# Patient Record
Sex: FEMALE | Race: WHITE | NOT HISPANIC OR LATINO | Employment: PART TIME | URBAN - METROPOLITAN AREA
[De-identification: names, ages, dates, MRNs, and addresses within clinical notes are randomized per-mention and may not be internally consistent; named-entity substitution may affect disease eponyms.]

---

## 2017-02-06 ENCOUNTER — RESULTS ENCOUNTER (OUTPATIENT)
Dept: INTERNAL MEDICINE | Facility: CLINIC | Age: 42
End: 2017-02-06

## 2017-02-06 DIAGNOSIS — Z00.00 HEALTHCARE MAINTENANCE: ICD-10-CM

## 2017-02-06 DIAGNOSIS — Z00.00 HEALTHCARE MAINTENANCE: Primary | ICD-10-CM

## 2017-02-11 ENCOUNTER — RESULTS ENCOUNTER (OUTPATIENT)
Dept: INTERNAL MEDICINE | Facility: CLINIC | Age: 42
End: 2017-02-11

## 2017-02-11 DIAGNOSIS — Z00.00 HEALTHCARE MAINTENANCE: ICD-10-CM

## 2017-04-17 ENCOUNTER — APPOINTMENT (OUTPATIENT)
Dept: WOMENS IMAGING | Facility: HOSPITAL | Age: 42
End: 2017-04-17

## 2017-04-17 PROCEDURE — 77067 SCR MAMMO BI INCL CAD: CPT | Performed by: RADIOLOGY

## 2017-07-24 ENCOUNTER — TELEPHONE (OUTPATIENT)
Dept: INTERNAL MEDICINE | Facility: CLINIC | Age: 42
End: 2017-07-24

## 2017-07-24 DIAGNOSIS — Z00.00 ROUTINE ADULT HEALTH MAINTENANCE: Primary | ICD-10-CM

## 2017-07-24 NOTE — TELEPHONE ENCOUNTER
Order put in.  Patient advised.    ----- Message from Naima Montiel MA sent at 2017 10:31 AM EDT -----  Regarding: FW: LAB ORDERS  Contact: [phone]      ----- Message -----     From: Kym Gomez     Sent: 2017  10:24 AM       To: Sukhdev Martini [facility]  Subject: LAB ORDERS                                       :  79  FRANCISCO DAVIS.    PATIENT WANTS TO HAVE HER LABS DONE AT [facility]. SHE CALLED AND THEY DO NOT HAVE HER ORDER. PATIENT WANTS TO GET THEM TODAY/TOMORROW. SHE WILL NEED TO HAVE URINE, THYROID PANEL, AND LABS CHECKED.    PLEASE CALL HER WHEN THE ORDER HAS BEEN PUT IN SYSTEM.

## 2017-07-26 ENCOUNTER — LAB (OUTPATIENT)
Dept: LAB | Facility: HOSPITAL | Age: 42
End: 2017-07-26

## 2017-07-26 DIAGNOSIS — Z00.00 ROUTINE MEDICAL EXAM: Primary | ICD-10-CM

## 2017-07-26 LAB
ALBUMIN SERPL-MCNC: 4.4 G/DL (ref 3.5–5.2)
ALBUMIN/GLOB SERPL: 1.5 G/DL
ALP SERPL-CCNC: 77 U/L (ref 39–117)
ALT SERPL W P-5'-P-CCNC: 19 U/L (ref 1–33)
ANION GAP SERPL CALCULATED.3IONS-SCNC: 13.7 MMOL/L
AST SERPL-CCNC: 18 U/L (ref 1–32)
BACTERIA UR QL AUTO: ABNORMAL /HPF
BASOPHILS # BLD AUTO: 0.02 10*3/MM3 (ref 0–0.2)
BASOPHILS NFR BLD AUTO: 0.2 % (ref 0–1.5)
BILIRUB SERPL-MCNC: 0.5 MG/DL (ref 0.1–1.2)
BILIRUB UR QL STRIP: NEGATIVE
BUN BLD-MCNC: 10 MG/DL (ref 6–20)
BUN/CREAT SERPL: 13.5 (ref 7–25)
CALCIUM SPEC-SCNC: 8.9 MG/DL (ref 8.6–10.5)
CHLORIDE SERPL-SCNC: 102 MMOL/L (ref 98–107)
CHOLEST SERPL-MCNC: 217 MG/DL (ref 0–200)
CLARITY UR: CLEAR
CO2 SERPL-SCNC: 25.3 MMOL/L (ref 22–29)
COLOR UR: YELLOW
CREAT BLD-MCNC: 0.74 MG/DL (ref 0.57–1)
DEPRECATED RDW RBC AUTO: 40 FL (ref 37–54)
EOSINOPHIL # BLD AUTO: 0.04 10*3/MM3 (ref 0–0.7)
EOSINOPHIL NFR BLD AUTO: 0.5 % (ref 0.3–6.2)
ERYTHROCYTE [DISTWIDTH] IN BLOOD BY AUTOMATED COUNT: 12.7 % (ref 11.7–13)
GFR SERPL CREATININE-BSD FRML MDRD: 87 ML/MIN/1.73
GLOBULIN UR ELPH-MCNC: 3 GM/DL
GLUCOSE BLD-MCNC: 90 MG/DL (ref 65–99)
GLUCOSE UR STRIP-MCNC: NEGATIVE MG/DL
HCT VFR BLD AUTO: 41.6 % (ref 35.6–45.5)
HDLC SERPL QL: 5.29
HDLC SERPL-MCNC: 41 MG/DL (ref 40–60)
HGB BLD-MCNC: 14.5 G/DL (ref 11.9–15.5)
HGB UR QL STRIP.AUTO: ABNORMAL
HYALINE CASTS UR QL AUTO: ABNORMAL /LPF
IMM GRANULOCYTES # BLD: 0.02 10*3/MM3 (ref 0–0.03)
IMM GRANULOCYTES NFR BLD: 0.2 % (ref 0–0.5)
KETONES UR QL STRIP: NEGATIVE
LDLC SERPL CALC-MCNC: 151 MG/DL (ref 0–100)
LEUKOCYTE ESTERASE UR QL STRIP.AUTO: NEGATIVE
LYMPHOCYTES # BLD AUTO: 2.01 10*3/MM3 (ref 0.9–4.8)
LYMPHOCYTES NFR BLD AUTO: 23 % (ref 19.6–45.3)
MCH RBC QN AUTO: 30.3 PG (ref 26.9–32)
MCHC RBC AUTO-ENTMCNC: 34.9 G/DL (ref 32.4–36.3)
MCV RBC AUTO: 86.8 FL (ref 80.5–98.2)
MONOCYTES # BLD AUTO: 0.49 10*3/MM3 (ref 0.2–1.2)
MONOCYTES NFR BLD AUTO: 5.6 % (ref 5–12)
NEUTROPHILS # BLD AUTO: 6.15 10*3/MM3 (ref 1.9–8.1)
NEUTROPHILS NFR BLD AUTO: 70.5 % (ref 42.7–76)
NITRITE UR QL STRIP: NEGATIVE
NRBC BLD MANUAL-RTO: 0 /100 WBC (ref 0–0)
PH UR STRIP.AUTO: 7.5 [PH] (ref 5–8)
PLATELET # BLD AUTO: 246 10*3/MM3 (ref 140–500)
PMV BLD AUTO: 9.7 FL (ref 6–12)
POTASSIUM BLD-SCNC: 3.6 MMOL/L (ref 3.5–5.2)
PROT SERPL-MCNC: 7.4 G/DL (ref 6–8.5)
PROT UR QL STRIP: ABNORMAL
RBC # BLD AUTO: 4.79 10*6/MM3 (ref 3.9–5.2)
RBC # UR: ABNORMAL /HPF
REF LAB TEST METHOD: ABNORMAL
SODIUM BLD-SCNC: 141 MMOL/L (ref 136–145)
SP GR UR STRIP: 1.02 (ref 1–1.03)
SQUAMOUS #/AREA URNS HPF: ABNORMAL /HPF
T4 FREE SERPL-MCNC: 1.18 NG/DL (ref 0.93–1.7)
TRIGL SERPL-MCNC: 126 MG/DL (ref 0–150)
TSH SERPL DL<=0.05 MIU/L-ACNC: 0.95 MIU/ML (ref 0.27–4.2)
UROBILINOGEN UR QL STRIP: ABNORMAL
VLDLC SERPL-MCNC: 25.2 MG/DL (ref 5–40)
WBC NRBC COR # BLD: 8.73 10*3/MM3 (ref 4.5–10.7)
WBC UR QL AUTO: ABNORMAL /HPF

## 2017-07-26 PROCEDURE — 84443 ASSAY THYROID STIM HORMONE: CPT

## 2017-07-26 PROCEDURE — 36415 COLL VENOUS BLD VENIPUNCTURE: CPT

## 2017-07-26 PROCEDURE — 80053 COMPREHEN METABOLIC PANEL: CPT

## 2017-07-26 PROCEDURE — 80061 LIPID PANEL: CPT | Performed by: INTERNAL MEDICINE

## 2017-07-26 PROCEDURE — 85025 COMPLETE CBC W/AUTO DIFF WBC: CPT

## 2017-07-26 PROCEDURE — 81003 URINALYSIS AUTO W/O SCOPE: CPT

## 2017-07-26 PROCEDURE — 81001 URINALYSIS AUTO W/SCOPE: CPT | Performed by: INTERNAL MEDICINE

## 2017-07-26 PROCEDURE — 84439 ASSAY OF FREE THYROXINE: CPT | Performed by: INTERNAL MEDICINE

## 2017-08-08 ENCOUNTER — OFFICE VISIT (OUTPATIENT)
Dept: INTERNAL MEDICINE | Facility: CLINIC | Age: 42
End: 2017-08-08

## 2017-08-08 VITALS
HEIGHT: 65 IN | SYSTOLIC BLOOD PRESSURE: 120 MMHG | OXYGEN SATURATION: 98 % | RESPIRATION RATE: 16 BRPM | HEART RATE: 90 BPM | DIASTOLIC BLOOD PRESSURE: 80 MMHG | BODY MASS INDEX: 24.66 KG/M2 | WEIGHT: 148 LBS

## 2017-08-08 DIAGNOSIS — Z80.1 FAMILY HISTORY OF LUNG CANCER: ICD-10-CM

## 2017-08-08 DIAGNOSIS — R31.29 HEMATURIA, MICROSCOPIC: ICD-10-CM

## 2017-08-08 DIAGNOSIS — Z00.00 WELL ADULT EXAM: Primary | ICD-10-CM

## 2017-08-08 PROCEDURE — 99396 PREV VISIT EST AGE 40-64: CPT | Performed by: INTERNAL MEDICINE

## 2017-08-08 NOTE — PROGRESS NOTES
Subjective   Diane Ruth is a 42 y.o. female.     History of Present Illness     The following portions of the patient's history were reviewed and updated as appropriate: allergies, current medications, past family history, past medical history, past social history, past surgical history and problem list.  43 yo female with pmhx here for her wellness  She has transient hematuria, evaluated with 4 normal urine screens.   Doing ok. She has gained a little weight - around 7 pounds.  She really does eat very healthy although not very restrictive.     Review of Systems   Constitutional: Negative for appetite change, fatigue, fever and unexpected weight change.   HENT: Negative.  Negative for sinus pressure and trouble swallowing.    Respiratory: Negative.  Negative for cough and chest tightness.    Cardiovascular: Negative.  Negative for chest pain, palpitations and leg swelling.   Gastrointestinal: Negative for constipation and diarrhea.   Genitourinary: Negative.  Negative for dysuria, frequency, hematuria, pelvic pain and vaginal discharge.        On ocp   Musculoskeletal: Negative.    Skin: Negative.    Neurological: Negative.  Negative for dizziness, light-headedness and headaches.   Hematological: Negative.    Psychiatric/Behavioral: Negative.  Negative for confusion and sleep disturbance. The patient is not nervous/anxious.    All other systems reviewed and are negative.      Objective   Physical Exam   Constitutional: She is oriented to person, place, and time. She appears well-developed and well-nourished.   HENT:   Head: Normocephalic and atraumatic.   Right Ear: External ear normal.   Left Ear: External ear normal.   Eyes: EOM are normal. Pupils are equal, round, and reactive to light.   Neck: Normal range of motion. Neck supple. No thyromegaly present.   Cardiovascular: Normal rate, regular rhythm and normal heart sounds.    No murmur heard.  Pulmonary/Chest: Effort normal and breath sounds normal.    Abdominal: Soft.   Neurological: She is alert and oriented to person, place, and time.   Skin: Skin is warm and dry. No erythema.   Psychiatric: She has a normal mood and affect. Her behavior is normal.   Nursing note and vitals reviewed.  chol 217, HDL 41, 1.1% risk per JEAN-PAUL calculator  CBC and CMP normal  UA with ++RBCs.   Assessment/Plan   Diane was seen today for annual exam.    Diagnoses and all orders for this visit:    Well adult exam        Hematuria - chronic  2 repeat urine specimens.  Continue daily exercise.    Lo dose CT screening for family history.  Will try to get covered.

## 2017-08-10 ENCOUNTER — TELEPHONE (OUTPATIENT)
Dept: INTERNAL MEDICINE | Facility: CLINIC | Age: 42
End: 2017-08-10

## 2017-08-18 ENCOUNTER — LAB (OUTPATIENT)
Dept: LAB | Facility: HOSPITAL | Age: 42
End: 2017-08-18

## 2017-08-18 DIAGNOSIS — R31.9 HEMATURIA: Primary | ICD-10-CM

## 2017-08-18 LAB
BACTERIA UR QL AUTO: NORMAL /HPF
BILIRUB UR QL STRIP: NEGATIVE
BILIRUB UR QL STRIP: NEGATIVE
CLARITY UR: CLEAR
CLARITY UR: CLEAR
COD CRY URNS QL: NORMAL /HPF
COLOR UR: YELLOW
COLOR UR: YELLOW
GLUCOSE UR STRIP-MCNC: NEGATIVE MG/DL
GLUCOSE UR STRIP-MCNC: NEGATIVE MG/DL
HGB UR QL STRIP.AUTO: ABNORMAL
HGB UR QL STRIP.AUTO: ABNORMAL
HYALINE CASTS UR QL AUTO: NORMAL /LPF
KETONES UR QL STRIP: NEGATIVE
KETONES UR QL STRIP: NEGATIVE
LEUKOCYTE ESTERASE UR QL STRIP.AUTO: NEGATIVE
LEUKOCYTE ESTERASE UR QL STRIP.AUTO: NEGATIVE
NITRITE UR QL STRIP: NEGATIVE
NITRITE UR QL STRIP: NEGATIVE
PH UR STRIP.AUTO: 6 [PH] (ref 5–8)
PH UR STRIP.AUTO: 6.5 [PH] (ref 5–8)
PROT UR QL STRIP: NEGATIVE
PROT UR QL STRIP: NEGATIVE
RBC # UR: NORMAL /HPF
REF LAB TEST METHOD: NORMAL
SP GR UR STRIP: 1.01 (ref 1–1.03)
SP GR UR STRIP: 1.02 (ref 1–1.03)
SQUAMOUS #/AREA URNS HPF: NORMAL /HPF
UROBILINOGEN UR QL STRIP: ABNORMAL
UROBILINOGEN UR QL STRIP: ABNORMAL
WBC UR QL AUTO: NORMAL /HPF

## 2017-08-18 PROCEDURE — 81003 URINALYSIS AUTO W/O SCOPE: CPT

## 2017-08-18 PROCEDURE — 81001 URINALYSIS AUTO W/SCOPE: CPT | Performed by: INTERNAL MEDICINE

## 2017-08-28 DIAGNOSIS — Z87.448 HX OF HEMATURIA: Primary | ICD-10-CM

## 2017-11-08 ENCOUNTER — TELEPHONE (OUTPATIENT)
Dept: INTERNAL MEDICINE | Facility: CLINIC | Age: 42
End: 2017-11-08

## 2017-11-08 ENCOUNTER — LAB (OUTPATIENT)
Dept: LAB | Facility: HOSPITAL | Age: 42
End: 2017-11-08

## 2017-11-08 DIAGNOSIS — Z87.448 HX OF HEMATURIA: ICD-10-CM

## 2017-11-08 LAB
BACTERIA UR QL AUTO: ABNORMAL /HPF
BILIRUB UR QL STRIP: NEGATIVE
CLARITY UR: CLEAR
COLOR UR: YELLOW
GLUCOSE UR STRIP-MCNC: NEGATIVE MG/DL
HGB UR QL STRIP.AUTO: ABNORMAL
HYALINE CASTS UR QL AUTO: ABNORMAL /LPF
KETONES UR QL STRIP: NEGATIVE
LEUKOCYTE ESTERASE UR QL STRIP.AUTO: NEGATIVE
NITRITE UR QL STRIP: NEGATIVE
PH UR STRIP.AUTO: 6.5 [PH] (ref 5–8)
PROT UR QL STRIP: NEGATIVE
RBC # UR: ABNORMAL /HPF
REF LAB TEST METHOD: ABNORMAL
SP GR UR STRIP: 1.02 (ref 1–1.03)
SQUAMOUS #/AREA URNS HPF: ABNORMAL /HPF
UROBILINOGEN UR QL STRIP: ABNORMAL
WBC UR QL AUTO: ABNORMAL /HPF

## 2017-11-08 PROCEDURE — 81001 URINALYSIS AUTO W/SCOPE: CPT | Performed by: INTERNAL MEDICINE

## 2017-11-08 NOTE — TELEPHONE ENCOUNTER
-  Patient aware of results      ---- Message from Carlos Farrell MD sent at 11/8/2017  4:39 PM EST -----  Does have bloodin urine, but still under 10 cells, recheck next year

## 2017-12-13 ENCOUNTER — TELEPHONE (OUTPATIENT)
Dept: INTERNAL MEDICINE | Facility: CLINIC | Age: 42
End: 2017-12-13

## 2017-12-13 NOTE — TELEPHONE ENCOUNTER
"-  Talked with Pt.  Advised her to call [facility] to see why they are charging the additional amount.. ?mistake or deductiable.  Call me back if no resolution.        ---- Message from Brii Davis sent at 12/12/2017  2:02 PM EST -----  Regarding: LUNG CANCER SCREENING TEST COST  Contact: [phone]  FRANCISCO PT    This patient called to discuss the billing issue with this test. Patient said that she was reluctant to do the test and asked \"us\" how much this would cost. According to her we told her that the cost would be $236.00. She said that dr matta wanted her to have this because of all of the cancer in her family. She paid the $236.00 but now she has a bill for an additional $614.00.    She would like for you to call her back    Thank you,  Brii"

## 2017-12-28 ENCOUNTER — TRANSCRIBE ORDERS (OUTPATIENT)
Dept: ADMINISTRATIVE | Facility: HOSPITAL | Age: 42
End: 2017-12-28

## 2017-12-28 DIAGNOSIS — E04.1 THYROID NODULE: Primary | ICD-10-CM

## 2018-01-09 ENCOUNTER — HOSPITAL ENCOUNTER (OUTPATIENT)
Dept: ULTRASOUND IMAGING | Facility: HOSPITAL | Age: 43
Discharge: HOME | End: 2018-01-09
Attending: OTOLARYNGOLOGY | Admitting: OTOLARYNGOLOGY

## 2018-01-09 DIAGNOSIS — E04.1 THYROID NODULE: ICD-10-CM

## 2018-01-09 PROCEDURE — 76536 US EXAM OF HEAD AND NECK: CPT

## 2018-01-30 ENCOUNTER — APPOINTMENT (OUTPATIENT)
Dept: WOMENS IMAGING | Facility: HOSPITAL | Age: 43
End: 2018-01-30

## 2018-01-30 PROCEDURE — 77063 BREAST TOMOSYNTHESIS BI: CPT | Performed by: RADIOLOGY

## 2018-01-30 PROCEDURE — 77067 SCR MAMMO BI INCL CAD: CPT | Performed by: RADIOLOGY

## 2018-05-03 DIAGNOSIS — Z23 NEED FOR PROPHYLACTIC VACCINATION AGAINST HEPATITIS A: Primary | ICD-10-CM

## 2018-07-03 DIAGNOSIS — Z00.00 ROUTINE ADULT HEALTH MAINTENANCE: Primary | ICD-10-CM

## 2018-08-01 ENCOUNTER — RESULTS ENCOUNTER (OUTPATIENT)
Dept: INTERNAL MEDICINE | Facility: CLINIC | Age: 43
End: 2018-08-01

## 2018-08-01 DIAGNOSIS — Z00.00 ROUTINE ADULT HEALTH MAINTENANCE: ICD-10-CM

## 2018-08-16 ENCOUNTER — LAB (OUTPATIENT)
Dept: LAB | Facility: HOSPITAL | Age: 43
End: 2018-08-16

## 2018-08-16 DIAGNOSIS — R31.29 HEMATURIA, MICROSCOPIC: Primary | ICD-10-CM

## 2018-08-16 DIAGNOSIS — R31.29 HEMATURIA, MICROSCOPIC: ICD-10-CM

## 2018-08-16 DIAGNOSIS — Z00.00 WELL ADULT EXAM: ICD-10-CM

## 2018-08-16 DIAGNOSIS — Z00.00 HEALTH MAINTENANCE EXAMINATION: Primary | ICD-10-CM

## 2018-08-16 LAB
ALBUMIN SERPL-MCNC: 4.2 G/DL (ref 3.5–5.2)
ALBUMIN/GLOB SERPL: 1.4 G/DL
ALP SERPL-CCNC: 84 U/L (ref 39–117)
ALT SERPL W P-5'-P-CCNC: 14 U/L (ref 1–33)
ANION GAP SERPL CALCULATED.3IONS-SCNC: 11.6 MMOL/L
AST SERPL-CCNC: 17 U/L (ref 1–32)
BACTERIA UR QL AUTO: ABNORMAL /HPF
BASOPHILS # BLD AUTO: 0.03 10*3/MM3 (ref 0–0.2)
BASOPHILS NFR BLD AUTO: 0.4 % (ref 0–1.5)
BILIRUB SERPL-MCNC: 0.5 MG/DL (ref 0.1–1.2)
BILIRUB UR QL STRIP: NEGATIVE
BUN BLD-MCNC: 10 MG/DL (ref 6–20)
BUN/CREAT SERPL: 13.5 (ref 7–25)
CALCIUM SPEC-SCNC: 9.1 MG/DL (ref 8.6–10.5)
CHLORIDE SERPL-SCNC: 104 MMOL/L (ref 98–107)
CHOLEST SERPL-MCNC: 184 MG/DL (ref 0–200)
CLARITY UR: CLEAR
CO2 SERPL-SCNC: 25.4 MMOL/L (ref 22–29)
COLOR UR: YELLOW
CREAT BLD-MCNC: 0.74 MG/DL (ref 0.57–1)
DEPRECATED RDW RBC AUTO: 39.9 FL (ref 37–54)
EOSINOPHIL # BLD AUTO: 0.1 10*3/MM3 (ref 0–0.7)
EOSINOPHIL NFR BLD AUTO: 1.3 % (ref 0.3–6.2)
ERYTHROCYTE [DISTWIDTH] IN BLOOD BY AUTOMATED COUNT: 12.7 % (ref 11.7–13)
GFR SERPL CREATININE-BSD FRML MDRD: 86 ML/MIN/1.73
GLOBULIN UR ELPH-MCNC: 2.9 GM/DL
GLUCOSE BLD-MCNC: 91 MG/DL (ref 65–99)
GLUCOSE UR STRIP-MCNC: NEGATIVE MG/DL
HCT VFR BLD AUTO: 39.9 % (ref 35.6–45.5)
HDLC SERPL-MCNC: 35 MG/DL (ref 40–60)
HGB BLD-MCNC: 14 G/DL (ref 11.9–15.5)
HGB UR QL STRIP.AUTO: ABNORMAL
HYALINE CASTS UR QL AUTO: ABNORMAL /LPF
IMM GRANULOCYTES # BLD: 0.03 10*3/MM3 (ref 0–0.03)
IMM GRANULOCYTES NFR BLD: 0.4 % (ref 0–0.5)
KETONES UR QL STRIP: NEGATIVE
LDLC SERPL CALC-MCNC: 123 MG/DL (ref 0–100)
LDLC/HDLC SERPL: 3.51 {RATIO}
LEUKOCYTE ESTERASE UR QL STRIP.AUTO: NEGATIVE
LYMPHOCYTES # BLD AUTO: 1.95 10*3/MM3 (ref 0.9–4.8)
LYMPHOCYTES NFR BLD AUTO: 24.7 % (ref 19.6–45.3)
MCH RBC QN AUTO: 30.4 PG (ref 26.9–32)
MCHC RBC AUTO-ENTMCNC: 35.1 G/DL (ref 32.4–36.3)
MCV RBC AUTO: 86.7 FL (ref 80.5–98.2)
MONOCYTES # BLD AUTO: 0.51 10*3/MM3 (ref 0.2–1.2)
MONOCYTES NFR BLD AUTO: 6.4 % (ref 5–12)
NEUTROPHILS # BLD AUTO: 5.29 10*3/MM3 (ref 1.9–8.1)
NEUTROPHILS NFR BLD AUTO: 66.8 % (ref 42.7–76)
NITRITE UR QL STRIP: NEGATIVE
NRBC BLD MANUAL-RTO: 0 /100 WBC (ref 0–0)
PH UR STRIP.AUTO: 6 [PH] (ref 5–8)
PLATELET # BLD AUTO: 223 10*3/MM3 (ref 140–500)
PMV BLD AUTO: 9.8 FL (ref 6–12)
POTASSIUM BLD-SCNC: 3.8 MMOL/L (ref 3.5–5.2)
PROT SERPL-MCNC: 7.1 G/DL (ref 6–8.5)
PROT UR QL STRIP: NEGATIVE
RBC # BLD AUTO: 4.6 10*6/MM3 (ref 3.9–5.2)
RBC # UR: ABNORMAL /HPF
REF LAB TEST METHOD: ABNORMAL
SODIUM BLD-SCNC: 141 MMOL/L (ref 136–145)
SP GR UR STRIP: 1.02 (ref 1–1.03)
SQUAMOUS #/AREA URNS HPF: ABNORMAL /HPF
T4 FREE SERPL-MCNC: 1.27 NG/DL (ref 0.93–1.7)
TRIGL SERPL-MCNC: 131 MG/DL (ref 0–150)
TSH SERPL DL<=0.05 MIU/L-ACNC: 1.25 MIU/ML (ref 0.27–4.2)
UROBILINOGEN UR QL STRIP: ABNORMAL
VLDLC SERPL-MCNC: 26.2 MG/DL (ref 5–40)
WBC NRBC COR # BLD: 7.91 10*3/MM3 (ref 4.5–10.7)
WBC UR QL AUTO: ABNORMAL /HPF

## 2018-08-16 PROCEDURE — 84439 ASSAY OF FREE THYROXINE: CPT | Performed by: INTERNAL MEDICINE

## 2018-08-16 PROCEDURE — 84443 ASSAY THYROID STIM HORMONE: CPT

## 2018-08-16 PROCEDURE — 80053 COMPREHEN METABOLIC PANEL: CPT

## 2018-08-16 PROCEDURE — 81001 URINALYSIS AUTO W/SCOPE: CPT | Performed by: INTERNAL MEDICINE

## 2018-08-16 PROCEDURE — 80061 LIPID PANEL: CPT | Performed by: INTERNAL MEDICINE

## 2018-08-16 PROCEDURE — 36415 COLL VENOUS BLD VENIPUNCTURE: CPT

## 2018-08-16 PROCEDURE — 85025 COMPLETE CBC W/AUTO DIFF WBC: CPT

## 2018-09-05 ENCOUNTER — OFFICE VISIT (OUTPATIENT)
Dept: INTERNAL MEDICINE | Facility: CLINIC | Age: 43
End: 2018-09-05

## 2018-09-05 VITALS
HEIGHT: 65 IN | WEIGHT: 152.8 LBS | BODY MASS INDEX: 25.46 KG/M2 | RESPIRATION RATE: 16 BRPM | SYSTOLIC BLOOD PRESSURE: 130 MMHG | OXYGEN SATURATION: 98 % | HEART RATE: 81 BPM | DIASTOLIC BLOOD PRESSURE: 90 MMHG

## 2018-09-05 DIAGNOSIS — R94.31 EKG ABNORMALITIES: ICD-10-CM

## 2018-09-05 DIAGNOSIS — R31.29 HEMATURIA, MICROSCOPIC: Primary | ICD-10-CM

## 2018-09-05 PROCEDURE — 99396 PREV VISIT EST AGE 40-64: CPT | Performed by: INTERNAL MEDICINE

## 2018-09-05 NOTE — PROGRESS NOTES
Subjective   Diane Ruth is a 43 y.o. female.     History of Present Illness     44 y/o who presents for annual exam.  No significant past medical history.  + FH of lung cancer in mother at 55 and had [address] lung for screening which was normal.  Denies any complaints at this time.  Walks for exercise and has had no significant weight changes.  Sleeps well and without fatigue.  Denies constipation, diarrhea, dysphagia, chest pain, shortness of air, joint pain, swelling.  No smoking.  Denies CP or SOA with exertion though does minimal exertion.  No FH of cardiac disease.  Denies any syncopal episodes, diaphoresis, headaches, vision changes.  Only takes birth control for medication.  Has history of asymptomatic microscopic hematuria.      The following portions of the patient's history were reviewed and updated as appropriate: allergies, current medications, past family history, past medical history, past social history, past surgical history and problem list.    Review of Systems   Constitutional: Negative for appetite change, fatigue, fever and unexpected weight change.   HENT: Negative.  Negative for sinus pressure and trouble swallowing.    Respiratory: Negative.  Negative for cough and chest tightness.    Cardiovascular: Negative.  Negative for chest pain, palpitations and leg swelling.   Gastrointestinal: Negative for constipation and diarrhea.   Genitourinary: Negative.  Negative for dysuria, frequency, hematuria, pelvic pain and vaginal discharge.        On ocp   Musculoskeletal: Negative.    Skin: Negative.    Neurological: Negative.  Negative for dizziness, light-headedness and headaches.   Hematological: Negative.    Psychiatric/Behavioral: Negative.  Negative for confusion and sleep disturbance. The patient is not nervous/anxious.    All other systems reviewed and are negative.      Objective   Physical Exam   Constitutional: She is oriented to person, place, and time. She appears well-developed and  well-nourished.   HENT:   Head: Normocephalic and atraumatic.   Right Ear: External ear normal.   Left Ear: External ear normal.   Mildly enlarged thyroid with nodularity.    Eyes: Pupils are equal, round, and reactive to light. EOM are normal.   Neck: Normal range of motion. Neck supple. No thyromegaly present.   Cardiovascular: Normal rate, regular rhythm and normal heart sounds.    No murmur heard.  Pulmonary/Chest: Effort normal and breath sounds normal.   Abdominal: Soft. Bowel sounds are normal. She exhibits no distension. There is no tenderness.   Neurological: She is alert and oriented to person, place, and time. No cranial nerve deficit.   Skin: Skin is warm and dry. No erythema.   Psychiatric: She has a normal mood and affect. Her behavior is normal.   Nursing note and vitals reviewed.      Assessment/Plan   Diane was seen today for annual exam.    Diagnoses and all orders for this visit:    Hematuria, microscopic    EKG abnormalities      Hx of hashimoto's thyroiditis  - Normal labs and stable thyroid US    Microscopic hematuria  - Will continue to monitor  - discussed with urology and patient does not need cystoscopy at this time.      EKG changes  - Pt with EKG changes/T wave changes in V3-V4.    - Pt asymptomatic without CP or SOA at rest or exertion.   - ASCVD Risk 1.3%.  FH of hypercholesterolemia.    - Will get repeat EKG in 2-3 weeks. If unchanged, will get stress test.    - If repeat BP > 130/80 at next appointment, will consider addition of antihypertensive.      HCM  -PAP smear and Mammogram UTD and done yearly at obgyn.    - Counseled on healthy eating/exercise.   - UTC on tetanus     Addison Salinas MD  Med/Peds PGY-4    Patient seen and examined with resident physician   Agree with assessment and plan  Very unlikely to have any underlying CAD, mild change on EKG, repeat for now.     FU scheduled.

## 2018-09-05 NOTE — PATIENT INSTRUCTIONS
How to Take Your Blood Pressure  Blood pressure is a measurement of how strongly your blood is pressing against the walls of your arteries. Arteries are blood vessels that carry blood from your heart throughout your body. Your health care provider takes your blood pressure at each office visit. You can also take your own blood pressure at home with a blood pressure machine. You may need to take your own blood pressure:  · To confirm a diagnosis of high blood pressure (hypertension).  · To monitor your blood pressure over time.  · To make sure your blood pressure medicine is working.    Supplies needed:  To take your blood pressure, you will need a blood pressure machine. You can buy a blood pressure machine, or blood pressure monitor, at most [employer] or online. There are several types of home blood pressure monitors. When choosing one, consider the following:  · Choose a monitor that has an arm cuff.  · Choose a monitor that wraps snugly around your upper arm. You should be able to fit only one finger between your arm and the cuff.  · Do not choose a monitor that measures your blood pressure from your wrist or finger.    Your health care provider can suggest a reliable monitor that will meet your needs.  How to prepare  To get the most accurate reading, avoid the following for 30 minutes before you check your blood pressure:  · Drinking caffeine.  · Drinking alcohol.  · Eating.  · Smoking.  · Exercising.    Five minutes before you check your blood pressure:  · Empty your bladder.  · Sit quietly without talking in a dining chair, rather than in a soft couch or armchair.    How to take your blood pressure  To check your blood pressure, follow the instructions in the manual that came with your blood pressure monitor. If you have a digital blood pressure monitor, the instructions may be as follows:  1. Sit up straight.  2. Place your feet on the floor. Do not cross your ankles or legs.  3. Rest your left arm at the  "level of your heart on a table or desk or on the arm of a chair.  4. Pull up your shirt sleeve.  5. Wrap the blood pressure cuff around the upper part of your left arm, 1 inch (2.5 cm) above your elbow. It is best to wrap the cuff around bare skin.  6. Fit the cuff snugly around your arm. You should be able to place only one finger between the cuff and your arm.  7. Position the cord inside the groove of your elbow.  8. Press the power button.  9. Sit quietly while the cuff inflates and deflates.  10. Read the digital reading on the monitor screen and write it down (record it).  11. Wait 2-3 minutes, then repeat the steps, starting at step 1.    What does my blood pressure reading mean?  A blood pressure reading consists of a higher number over a lower number. Ideally, your blood pressure should be below 120/80. The first (\"top\") number is called the systolic pressure. It is a measure of the pressure in your arteries as your heart beats. The second (\"bottom\") number is called the diastolic pressure. It is a measure of the pressure in your arteries as the heart relaxes.  Blood pressure is classified into four stages. The following are the stages for adults who do not have a short-term serious illness or a chronic condition. Systolic pressure and diastolic pressure are measured in a unit called mm Hg.  Normal  · Systolic pressure: below 120.  · Diastolic pressure: below 80.  Elevated  · Systolic pressure: 120-129.  · Diastolic pressure: below 80.  Hypertension stage 1  · Systolic pressure: 130-139.  · Diastolic pressure: 80-89.  Hypertension stage 2  · Systolic pressure: 140 or above.  · Diastolic pressure: 90 or above.  You can have prehypertension or hypertension even if only the systolic or only the diastolic number in your reading is higher than normal.  Follow these instructions at home:  · Check your blood pressure as often as recommended by your health care provider.  · Take your monitor to the next appointment " with your health care provider to make sure:  ? That you are using it correctly.  ? That it provides accurate readings.  · Be sure you understand what your goal blood pressure numbers are.  · Tell your health care provider if you are having any side effects from blood pressure medicine.  Contact a health care provider if:  · Your blood pressure is consistently high.  Get help right away if:  · Your systolic blood pressure is higher than 180.  · Your diastolic blood pressure is higher than 110.  This information is not intended to replace advice given to you by your health care provider. Make sure you discuss any questions you have with your health care provider.  Document Released: 05/26/2017 Document Revised: 08/08/2017 Document Reviewed: 05/26/2017  Elsevier Interactive Patient Education © 2018 [employer]

## 2018-12-06 ENCOUNTER — TRANSCRIBE ORDERS (OUTPATIENT)
Dept: ADMINISTRATIVE | Facility: HOSPITAL | Age: 43
End: 2018-12-06

## 2018-12-06 DIAGNOSIS — E04.1 THYROID NODULE: Primary | ICD-10-CM

## 2019-01-07 ENCOUNTER — HOSPITAL ENCOUNTER (OUTPATIENT)
Dept: ULTRASOUND IMAGING | Facility: HOSPITAL | Age: 44
Discharge: HOME | End: 2019-01-07
Attending: OTOLARYNGOLOGY | Admitting: OTOLARYNGOLOGY

## 2019-01-07 DIAGNOSIS — E04.1 THYROID NODULE: ICD-10-CM

## 2019-01-07 PROCEDURE — 76536 US EXAM OF HEAD AND NECK: CPT

## 2019-02-06 ENCOUNTER — APPOINTMENT (OUTPATIENT)
Dept: WOMENS IMAGING | Facility: HOSPITAL | Age: 44
End: 2019-02-06

## 2019-02-06 PROCEDURE — 77067 SCR MAMMO BI INCL CAD: CPT | Performed by: RADIOLOGY

## 2019-02-06 PROCEDURE — 77063 BREAST TOMOSYNTHESIS BI: CPT | Performed by: RADIOLOGY

## 2019-12-17 ENCOUNTER — TRANSCRIBE ORDERS (OUTPATIENT)
Dept: ADMINISTRATIVE | Facility: HOSPITAL | Age: 44
End: 2019-12-17

## 2019-12-17 DIAGNOSIS — E04.1 THYROID NODULE: Primary | ICD-10-CM

## 2020-01-13 ENCOUNTER — HOSPITAL ENCOUNTER (OUTPATIENT)
Dept: ULTRASOUND IMAGING | Facility: HOSPITAL | Age: 45
Discharge: HOME | End: 2020-01-13
Admitting: OTOLARYNGOLOGY

## 2020-01-13 DIAGNOSIS — E04.1 THYROID NODULE: ICD-10-CM

## 2020-01-13 PROCEDURE — 76536 US EXAM OF HEAD AND NECK: CPT

## 2020-02-12 ENCOUNTER — APPOINTMENT (OUTPATIENT)
Dept: WOMENS IMAGING | Facility: HOSPITAL | Age: 45
End: 2020-02-12

## 2020-02-12 PROCEDURE — 77067 SCR MAMMO BI INCL CAD: CPT | Performed by: RADIOLOGY

## 2020-02-12 PROCEDURE — 77063 BREAST TOMOSYNTHESIS BI: CPT | Performed by: RADIOLOGY

## 2020-11-24 ENCOUNTER — TRANSCRIBE ORDERS (OUTPATIENT)
Dept: ADMINISTRATIVE | Facility: HOSPITAL | Age: 45
End: 2020-11-24

## 2020-11-24 DIAGNOSIS — E04.2 MULTIPLE THYROID NODULES: Primary | ICD-10-CM

## 2021-01-15 ENCOUNTER — HOSPITAL ENCOUNTER (OUTPATIENT)
Dept: ULTRASOUND IMAGING | Facility: HOSPITAL | Age: 46
Discharge: HOME | End: 2021-01-15
Admitting: OTOLARYNGOLOGY

## 2021-01-15 DIAGNOSIS — E04.2 MULTIPLE THYROID NODULES: ICD-10-CM

## 2021-01-15 PROCEDURE — 76536 US EXAM OF HEAD AND NECK: CPT

## 2021-02-17 ENCOUNTER — APPOINTMENT (OUTPATIENT)
Dept: WOMENS IMAGING | Facility: HOSPITAL | Age: 46
End: 2021-02-17

## 2021-11-23 ENCOUNTER — TRANSCRIBE ORDERS (OUTPATIENT)
Dept: ADMINISTRATIVE | Facility: HOSPITAL | Age: 46
End: 2021-11-23

## 2021-11-23 DIAGNOSIS — E04.1 THYROID NODULE: Primary | ICD-10-CM

## 2022-01-12 ENCOUNTER — HOSPITAL ENCOUNTER (OUTPATIENT)
Dept: ULTRASOUND IMAGING | Facility: HOSPITAL | Age: 47
Discharge: HOME | End: 2022-01-12
Admitting: OTOLARYNGOLOGY

## 2022-01-12 DIAGNOSIS — E04.1 THYROID NODULE: ICD-10-CM

## 2022-01-12 PROCEDURE — 76536 US EXAM OF HEAD AND NECK: CPT

## 2022-03-02 ENCOUNTER — APPOINTMENT (OUTPATIENT)
Dept: WOMENS IMAGING | Facility: HOSPITAL | Age: 47
End: 2022-03-02

## 2022-03-02 PROCEDURE — 77067 SCR MAMMO BI INCL CAD: CPT | Performed by: RADIOLOGY

## 2022-03-02 PROCEDURE — 77063 BREAST TOMOSYNTHESIS BI: CPT | Performed by: RADIOLOGY

## 2023-01-04 ENCOUNTER — TRANSCRIBE ORDERS (OUTPATIENT)
Dept: ADMINISTRATIVE | Facility: HOSPITAL | Age: 48
End: 2023-01-04
Payer: COMMERCIAL

## 2023-01-04 DIAGNOSIS — E04.1 NONTOXIC UNINODULAR GOITER: Primary | ICD-10-CM

## 2023-01-18 ENCOUNTER — HOSPITAL ENCOUNTER (OUTPATIENT)
Dept: ULTRASOUND IMAGING | Facility: HOSPITAL | Age: 48
Discharge: HOME | End: 2023-01-18
Admitting: OTOLARYNGOLOGY
Payer: COMMERCIAL

## 2023-01-18 DIAGNOSIS — E04.1 NONTOXIC UNINODULAR GOITER: ICD-10-CM

## 2023-01-18 PROCEDURE — 76536 US EXAM OF HEAD AND NECK: CPT

## 2023-01-19 NOTE — TELEPHONE ENCOUNTER
Insurance is not accepted   patient advised of normal results    ----- Message from Naima Montiel MA sent at 8/10/2017  3:15 PM EDT -----  Regarding: FW: MRI RESULTS FROM YESTERDAY      ----- Message -----     From: Susan Lomeli     Sent: 8/10/2017   2:30 PM       To: Sukhdev Martini [facility] Clinical Pool  Subject: MRI RESULTS FROM YESTERDAY                       [facility]    Patient asking for results from MRI done yesterday 8/9.  Report is in chart under media.  Patient is anxious to get results.    [phone]

## 2023-08-09 ENCOUNTER — PREP FOR SURGERY (OUTPATIENT)
Dept: OTHER | Facility: HOSPITAL | Age: 48
End: 2023-08-09
Payer: COMMERCIAL

## 2023-08-09 DIAGNOSIS — Z12.11 SCREENING FOR COLON CANCER: Primary | ICD-10-CM

## 2023-09-25 ENCOUNTER — ANESTHESIA EVENT (OUTPATIENT)
Dept: GASTROENTEROLOGY | Facility: HOSPITAL | Age: 48
End: 2023-09-25

## 2023-09-25 ENCOUNTER — ANESTHESIA (OUTPATIENT)
Dept: GASTROENTEROLOGY | Facility: HOSPITAL | Age: 48
End: 2023-09-25

## 2023-09-25 ENCOUNTER — HOSPITAL ENCOUNTER (OUTPATIENT)
Facility: HOSPITAL | Age: 48
Setting detail: HOSPITAL OUTPATIENT SURGERY
Discharge: HOME | End: 2023-09-25
Attending: SURGERY | Admitting: SURGERY
Payer: COMMERCIAL

## 2023-09-25 VITALS
WEIGHT: 159.2 LBS | HEART RATE: 72 BPM | BODY MASS INDEX: 26.52 KG/M2 | RESPIRATION RATE: 16 BRPM | DIASTOLIC BLOOD PRESSURE: 91 MMHG | SYSTOLIC BLOOD PRESSURE: 140 MMHG | OXYGEN SATURATION: 100 % | HEIGHT: 65 IN

## 2023-09-25 DIAGNOSIS — J01.90 ACUTE SINUSITIS, RECURRENCE NOT SPECIFIED, UNSPECIFIED LOCATION: ICD-10-CM

## 2023-09-25 PROBLEM — Z12.11 SCREENING FOR COLON CANCER: Status: ACTIVE | Noted: 2023-09-25

## 2023-09-25 LAB
B-HCG UR QL: NEGATIVE
EXPIRATION DATE: NORMAL
INTERNAL NEGATIVE CONTROL: NORMAL
INTERNAL POSITIVE CONTROL: NORMAL
Lab: NORMAL

## 2023-09-25 PROCEDURE — S0260 H&P FOR SURGERY: HCPCS | Performed by: SURGERY

## 2023-09-25 PROCEDURE — 81025 URINE PREGNANCY TEST: CPT | Performed by: SURGERY

## 2023-09-25 PROCEDURE — 45378 DIAGNOSTIC COLONOSCOPY: CPT | Performed by: SURGERY

## 2023-09-25 PROCEDURE — 25010000002 PROPOFOL 10 MG/ML EMULSION: Performed by: ANESTHESIOLOGY

## 2023-09-25 PROCEDURE — 25010000002 ONDANSETRON PER 1 MG: Performed by: ANESTHESIOLOGY

## 2023-09-25 RX ORDER — SODIUM CHLORIDE, SODIUM LACTATE, POTASSIUM CHLORIDE, CALCIUM CHLORIDE 600; 310; 30; 20 MG/100ML; MG/100ML; MG/100ML; MG/100ML
INJECTION, SOLUTION INTRAVENOUS CONTINUOUS PRN
Status: DISCONTINUED | OUTPATIENT
Start: 2023-09-25 | End: 2023-09-25 | Stop reason: SURG

## 2023-09-25 RX ORDER — LIDOCAINE HYDROCHLORIDE 20 MG/ML
INJECTION, SOLUTION INFILTRATION; PERINEURAL AS NEEDED
Status: DISCONTINUED | OUTPATIENT
Start: 2023-09-25 | End: 2023-09-25 | Stop reason: SURG

## 2023-09-25 RX ORDER — SODIUM CHLORIDE 0.9 % (FLUSH) 0.9 %
10 SYRINGE (ML) INJECTION AS NEEDED
Status: DISCONTINUED | OUTPATIENT
Start: 2023-09-25 | End: 2023-09-25 | Stop reason: HOSPADM

## 2023-09-25 RX ORDER — SODIUM CHLORIDE, SODIUM LACTATE, POTASSIUM CHLORIDE, CALCIUM CHLORIDE 600; 310; 30; 20 MG/100ML; MG/100ML; MG/100ML; MG/100ML
1000 INJECTION, SOLUTION INTRAVENOUS CONTINUOUS
Status: DISCONTINUED | OUTPATIENT
Start: 2023-09-25 | End: 2023-09-25 | Stop reason: HOSPADM

## 2023-09-25 RX ORDER — ONDANSETRON 2 MG/ML
INJECTION INTRAMUSCULAR; INTRAVENOUS AS NEEDED
Status: DISCONTINUED | OUTPATIENT
Start: 2023-09-25 | End: 2023-09-25 | Stop reason: SURG

## 2023-09-25 RX ADMIN — SODIUM CHLORIDE, POTASSIUM CHLORIDE, SODIUM LACTATE AND CALCIUM CHLORIDE: 600; 310; 30; 20 INJECTION, SOLUTION INTRAVENOUS at 12:45

## 2023-09-25 RX ADMIN — LIDOCAINE HYDROCHLORIDE 60 MG: 20 INJECTION, SOLUTION INFILTRATION; PERINEURAL at 12:50

## 2023-09-25 RX ADMIN — PROPOFOL 200 MCG/KG/MIN: 10 INJECTION, EMULSION INTRAVENOUS at 12:50

## 2023-09-25 RX ADMIN — ONDANSETRON 4 MG: 2 INJECTION INTRAMUSCULAR; INTRAVENOUS at 12:50

## 2023-09-25 RX ADMIN — SODIUM CHLORIDE, POTASSIUM CHLORIDE, SODIUM LACTATE AND CALCIUM CHLORIDE 1000 ML: 600; 310; 30; 20 INJECTION, SOLUTION INTRAVENOUS at 12:24

## 2023-09-25 NOTE — ANESTHESIA POSTPROCEDURE EVALUATION
Patient: Diane Ruth    Procedure Summary       Date: 09/25/23 Room / Location:  [facility] /  [facility]    Anesthesia Start: 1248 Anesthesia Stop: 1324    Procedure: COLONOSCOPY TO CECUM Diagnosis:       Screening for colon cancer      (Screening for colon cancer [Z12.11])    Surgeons: Leny Kendrick MD Provider: Elias Tatum MD    Anesthesia Type: MAC ASA Status: 2            Anesthesia Type: MAC    Vitals  Vitals Value Taken Time   /91 09/25/23 1345   Temp     Pulse 72 09/25/23 1345   Resp 16 09/25/23 1345   SpO2 100 % 09/25/23 1345           Post Anesthesia Care and Evaluation    No anesthesia care post op

## 2023-09-25 NOTE — H&P
General Surgery  History and Physical    CC: Screening for colon cancer    HPI: The patient is a pleasant 48 y.o. year-old lady who presents today for routine screening colonoscopy.  She denies any melena or hematochezia and has no family history of colon cancer.    Past Medical History:   Postoperative nausea/vomiting  History of endometriosis    Past Surgical History:   Laparoscopic cholecystectomy      Medications:   Medications Prior to Admission   Medication Sig Dispense Refill Last Dose    ALLERGY SERUM INJECTION Inject  under the skin into the appropriate area as directed 1 (One) Time.       norethindrone-ethinyl estradiol FE (JUNEL FE ) 1-20 MG-MCG per tablet Take 1 tablet by mouth Daily.   2023    cefdinir (OMNICEF) 300 MG capsule Take 1 capsule by mouth 2 (Two) Times a Day. 20 capsule 0     MethylPREDNISolone (MEDROL, JASON,) 4 MG tablet Take as directed on package instructions. 1 each 0        Allergies: Ciprofloxacin, Flagyl, azithromycin, erythromycin, sulfa antibiotics    Family History: Father with history of brain cancer, mother with history of lung cancer, no family history of gastrointestinal malignancy    Social History: , non-smoker, no regular alcohol use    ROS: A comprehensive review of systems was conducted and negative for melena or hematochezia  All other systems reviewed and negative    Physical Exam:  Vitals:    23 1214   BP: 142/95   Pulse: 80   Resp: 16   SpO2: 97%     Height: 165 cm  Weight: 72 kg  BMI: 26.4  General: No acute distress, well-nourished & well-developed  HEAD: normocephalic, atraumatic  EYES: normal conjunctiva, sclera anicteric  EARS: grossly normal hearing  NECK: supple, no thyromegaly  CARDIOVASCULAR: regular rate and rhythm  RESPIRATORY: clear to auscultation bilaterally  GASTROINTESTINAL: soft, nontender, non-distended  PSYCHIATRIC: oriented x3, normal mood and affect    ASSESSMENT & PLAN  Mrs. Ruth is a 48-year-old lady here for  routine screening colonoscopy.  She has been counseled on the risks of the procedure to include bleeding, colon perforation, and missed pathology.  Despite these risks, she has consented to proceed.    Leny Kendrick MD  General, Robotic, and Endoscopic Surgery  [facility]    [address], [address]  [place] [address]  P: [phone]  F: [phone]

## 2023-09-25 NOTE — ANESTHESIA PREPROCEDURE EVALUATION
Anesthesia Evaluation     history of anesthetic complications:  PONV               Airway   Mallampati: II  Dental - normal exam     Pulmonary    (+) pneumonia ,  (-) wheezes  Cardiovascular     Rhythm: regular        Neuro/Psych  GI/Hepatic/Renal/Endo    (+) thyroid problem     Musculoskeletal     Abdominal    Substance History      OB/GYN          Other                      Anesthesia Plan    ASA 2     MAC     (D/W pt. MAC and possible awareness intra op.  Pt understands MAC and GA are not the same and the possibility of GA being required for failed MAC)    Anesthetic plan, risks, benefits, and alternatives have been provided, discussed and informed consent has been obtained with: patient.    CODE STATUS:

## 2023-12-21 ENCOUNTER — TRANSCRIBE ORDERS (OUTPATIENT)
Dept: ADMINISTRATIVE | Facility: HOSPITAL | Age: 48
End: 2023-12-21
Payer: COMMERCIAL

## 2023-12-21 DIAGNOSIS — E04.1 NONTOXIC SINGLE THYROID NODULE: Primary | ICD-10-CM

## 2024-02-12 ENCOUNTER — HOSPITAL ENCOUNTER (OUTPATIENT)
Dept: ULTRASOUND IMAGING | Facility: HOSPITAL | Age: 49
Discharge: HOME | End: 2024-02-12
Admitting: OTOLARYNGOLOGY
Payer: COMMERCIAL

## 2024-02-12 DIAGNOSIS — E04.1 NONTOXIC SINGLE THYROID NODULE: ICD-10-CM

## 2024-02-12 PROCEDURE — 76536 US EXAM OF HEAD AND NECK: CPT

## 2025-01-16 ENCOUNTER — TRANSCRIBE ORDERS (OUTPATIENT)
Dept: ADMINISTRATIVE | Facility: HOSPITAL | Age: 50
End: 2025-01-16
Payer: COMMERCIAL

## 2025-01-16 DIAGNOSIS — E04.1 NONTOXIC SINGLE THYROID NODULE: Primary | ICD-10-CM

## 2025-02-14 ENCOUNTER — HOSPITAL ENCOUNTER (OUTPATIENT)
Dept: ULTRASOUND IMAGING | Facility: HOSPITAL | Age: 50
Discharge: HOME | End: 2025-02-14
Admitting: OTOLARYNGOLOGY
Payer: COMMERCIAL

## 2025-02-14 DIAGNOSIS — E04.1 NONTOXIC SINGLE THYROID NODULE: ICD-10-CM

## 2025-02-14 PROCEDURE — 76536 US EXAM OF HEAD AND NECK: CPT

## (undated) DEVICE — CANN O2 ETCO2 FITS ALL CONN CO2 SMPL A/ 7IN DISP LF

## (undated) DEVICE — ADAPT CLN BIOGUARD AIR/H2O DISP

## (undated) DEVICE — GOWN,SIRUS,NON REINFRCD,LARGE,SET IN SL: Brand: MEDLINE

## (undated) DEVICE — TUBING, SUCTION, 1/4" X 10', STRAIGHT: Brand: MEDLINE

## (undated) DEVICE — KT ORCA ORCAPOD DISP STRL

## (undated) DEVICE — SENSR O2 OXIMAX FNGR A/ 18IN NONSTR